# Patient Record
Sex: MALE | Race: WHITE
[De-identification: names, ages, dates, MRNs, and addresses within clinical notes are randomized per-mention and may not be internally consistent; named-entity substitution may affect disease eponyms.]

---

## 2017-10-11 ENCOUNTER — HOSPITAL ENCOUNTER (EMERGENCY)
Dept: HOSPITAL 80 - FED | Age: 32
Discharge: HOME | End: 2017-10-11
Payer: MEDICAID

## 2017-10-11 VITALS
HEART RATE: 91 BPM | OXYGEN SATURATION: 93 % | SYSTOLIC BLOOD PRESSURE: 166 MMHG | RESPIRATION RATE: 16 BRPM | DIASTOLIC BLOOD PRESSURE: 111 MMHG

## 2017-10-11 VITALS — TEMPERATURE: 97.9 F

## 2017-10-11 DIAGNOSIS — I11.9: Primary | ICD-10-CM

## 2017-10-11 DIAGNOSIS — G47.33: ICD-10-CM

## 2017-10-11 LAB
% IMMATURE GRANULYOCYTES: 1.8 % (ref 0–1.1)
ABSOLUTE IMMATURE GRANULOCYTES: 0.13 10^3/UL (ref 0–0.1)
ABSOLUTE NRBC COUNT: 0 10^3/UL (ref 0–0.01)
ADD DIFF?: NO
ADD MORPH?: NO
ADD SCAN?: NO
ALBUMIN SERPL-MCNC: 3.3 G/DL (ref 3.5–5)
ALP SERPL-CCNC: 49 IU/L (ref 38–126)
ALT SERPL-CCNC: 89 IU/L (ref 21–72)
ANION GAP SERPL CALC-SCNC: 9 MEQ/L (ref 8–16)
APTT BLD: 25.6 SEC (ref 23–38)
AST SERPL-CCNC: 71 IU/L (ref 17–59)
ATYPICAL LYMPHOCYTE FLAG: 0 (ref 0–99)
BILIRUB SERPL-MCNC: 1.4 MG/DL (ref 0.1–1.4)
BILIRUBIN-CONJUGATED: 0.4 MG/DL (ref 0–0.5)
BILIRUBIN-UNCONJUGATED: 1 MG/DL (ref 0–1.1)
CALCIUM SERPL-MCNC: 9.3 MG/DL (ref 8.5–10.4)
CHLORIDE SERPL-SCNC: 106 MEQ/L (ref 97–110)
CO2 SERPL-SCNC: 27 MEQ/L (ref 22–31)
CREAT SERPL-MCNC: 1.1 MG/DL (ref 0.7–1.3)
ERYTHROCYTE [DISTWIDTH] IN BLOOD BY AUTOMATED COUNT: 16.2 % (ref 11.5–15.2)
FRAGMENT RBC FLAG: 0 (ref 0–99)
GFR SERPL CREATININE-BSD FRML MDRD: > 60 ML/MIN/{1.73_M2}
GLUCOSE SERPL-MCNC: 166 MG/DL (ref 70–100)
HCT VFR BLD CALC: 61.3 % (ref 40–51)
HGB BLD-MCNC: 20.5 G/DL (ref 13.7–17.5)
INR PPP: 1 (ref 0.83–1.16)
LEFT SHIFT FLG: 10 (ref 0–99)
LIPEMIA HEMOLYSIS FLAG: 80 (ref 0–99)
MCH RBC BLDCO QN: 29.9 PG (ref 27.9–34.1)
MCHC RBC AUTO-ENTMCNC: 33.4 G/DL (ref 32.4–36.7)
MCV RBC AUTO: 89.4 FL (ref 81.5–99.8)
NRBC-AUTO%: 0 % (ref 0–0.2)
PLATELET # BLD: 230 10^3/UL (ref 150–400)
PLATELET CLUMPS FLAG: 20 (ref 0–99)
PMV BLD AUTO: 11.4 FL (ref 8.7–11.7)
POTASSIUM SERPL-SCNC: 5 MEQ/L (ref 3.5–5.2)
PROT SERPL-MCNC: 6 G/DL (ref 6.3–8.2)
PROTHROMBIN TIME: 13.1 SEC (ref 12–15)
RBC # BLD AUTO: 6.86 10^6/UL (ref 4.4–6.38)
SODIUM SERPL-SCNC: 142 MEQ/L (ref 134–144)
TROPONIN I SERPL-MCNC: 0.03 NG/ML (ref 0–0.03)

## 2017-10-11 PROCEDURE — 71020: CPT

## 2017-10-11 PROCEDURE — 99285 EMERGENCY DEPT VISIT HI MDM: CPT

## 2017-10-11 PROCEDURE — 93005 ELECTROCARDIOGRAM TRACING: CPT

## 2017-10-11 NOTE — EDPHY
H & P


Stated Complaint: SOB/wheezing x couple of wks; out of BP med>1yr


Time Seen by Provider: 10/11/17 09:38


HPI/ROS: 


HPI:  This is a 32-year-old male who presents with





Chief Complaint:SOB/wheezing x couple of wks; out of BP med>1yr





Location:  Chest


Quality:  Dyspnea


Duration:  Couple of weeks


Signs and Symptoms:  No lower extremity swelling, no chest pain, no palpitations

, no wheezing


Timing:  Worsening


Severity:  Moderate


Context:  Patient has a history of hypertension and AGUEDA; he has been out of his 

meds greater than a year and has not used his CPAP machine and greater than 1 

year.  Three weeks ago he believes that he had a virus that he contracted from 

his children when they return to school.  At that time he felt, low-grade fevers

, body aches and fatigue.  He normally works out weight lifting 5 days a week.  

He thought he was run down and took a break from weightlifting.  Yesterday he 

returned, and during his workout he became extremely tired and short of breath.

  He returned home and was doing some yard work and again felt inability to 

take a deep breath and when he did chest discomfort or pressure accompanied by 

a feeling of shortness of breath.  He denies any recent long distance trips or 

lower extremity swelling.  For surgery approximately 3 years ago, he had an 

outpatient cardiac stress test by a local cardiologist and it was negative per 

patient for ischemia.  He denies any feelings of chest pain/radiation/nausea/

vomiting.  He has not had any fevers in the last 2 weeks.  Denies a productive 

cough. 


Modifying Factors: 





Comment: 








ROS: see HPI


Constitutional: No fever, no chills, no weight loss


Eyes:  No blurred vision


Respiratory:  No shortness of breath, no cough


Cardiovascular:  No chest pain


Gastrointestinal:  No nausea, no vomiting, no diarrhea 


Genitourinary:  No dysuria 


Extremities:  No myalgias 


Neurologic:  No weakness, no numbness


Skin:  No rashes


Hematologic:  No bruising, no bleeding





MEDICAL/SURGICAL/SOCIAL HISTORY: 


Medical history:  HTN, L tricep rupture, AGUEDA


Surgical history:  Left hand surgery for cyst, wisdom tooth extraction.


Social history:  .  Has children. 











CONSTITUTIONAL:  Muscular white male, awake and alert, no obvious distress


HEENT: Atraumatic and normocephalic, PERRL, EOMI. Tympanic membranes clear.  

Full beard noted. Oropharynx clear, no exudate and moist pink mucosa.  Airway 

patent.  No lymphadenopathy.  


NECK:  Short and thick in girth. No meningismus.


Cardiovascular: Normal S1/S2, regular rate, regular rhythm, without murmur rub 

or gallop.


PULMONARY/CHEST:  Symmetrical and nontender. Clear to auscultation bilaterally. 

Good air movement. No accessory muscle usage.


ABDOMEN:  Soft, nondistended, nontender, no rebound, no guarding, no peritoneal 

signs, no masses or organomegaly. No CVAT.


EXTREMITIES:  2/2 pulses, no deformities, no clubbing, no cyanosis or edema.  

No calf tenderness.  No palpable cord.  Negative Homans sign. 


NEUROLOGICAL: no focal neuro deficits.  GCS 15.


SKIN: Warm and dry, no erythema. no rash.  Good capillary refill. 








Source: Patient


Exam Limitations: No limitations





- Personal History


Current Tetanus Diphtheria and Acellular Pertussis (TDAP): Yes





- Medical/Surgical History


Hx Asthma: No


Hx Chronic Respiratory Disease: No


Hx Diabetes: No


Hx Cardiac Disease: No


Hx Renal Disease: No


Hx Cirrhosis: No


Hx Alcoholism: No


Hx HIV/AIDS: No


Hx Splenectomy or Spleen Trauma: No


Other PMH: HTN, L tricep rupture, AGUEDA, L hand surgery for cyst, wisdom tooth 

extraction.





- Social History


Smoking Status: Never smoked


Constitutional: 


 Initial Vital Signs











Temperature (C)  36.6 C   10/11/17 09:05


 


Heart Rate  96   10/11/17 09:05


 


Respiratory Rate  18   10/11/17 09:05


 


Blood Pressure  167/113 H  10/11/17 09:05


 


O2 Sat (%)  91 L  10/11/17 09:05








 











O2 Delivery Mode               Room Air


 


O2 (L/minute)                  2














Allergies/Adverse Reactions: 


 





lisinopril Allergy (Intermediate, Verified 10/11/17 09:06)


 Wheezing


tramadol HCl [From Ultram] Allergy (Mild, Verified 10/11/17 09:06)


 lightheaded








Home Medications: 














 Medication  Instructions  Recorded


 


Losartan/Hydrochlorothiazide 1 each PO DAILY #30 tablet 10/11/17





[Hyzaar 100-25 Tablet]  














Medical Decision Making





- Diagnostics


EKG Interpretation: 


12 lead EKG:


Indication:  dyspnea 


Rhythm:  Normal sinus rhythm, rate of 66 beats per minute


Axis:  Normal


Intervals:  Normal


QRS:  Normal


ST segments:  Nonspecific changes and more prominent V2 V3


T waves:   Depressed II, III, avF, V5, V6


INTERPRETATION:  Compared with EKG on 07/23/2014


The 12 lead EKG was interpreted by myself and with attending.





Imaging Results: 


 Imaging Impressions





Chest X-Ray  10/11/17 09:45


Impression:  


1. Poor inspiratory phase.


2. Bronchitis/airways disease.


3. No definite pneumonia.











ED Course/Re-evaluation: 


Chest x-ray, labs, oxygen therapy ordered


O2 sats 91% on room air upon arrival


EKG noted to have depressed T-waves; and more prominent non specific ST changes


1030:  Labs reviewed and hemoglobin 20.5, hematocrit twists 61.3; significant 

erythrocytosis; must evaluate for polycythemia vera.  Could also be related to 

sleep apnea and morbid obesity. 


D-dimer unremarkable; troponin within normal limits


Chest xray my read shows mild cardiomegaly; no opacity/effusion/pneumothorax


Decision made to repeat troponin in 3 hours which will be 1300' 2nd troponin is 

0.02; decreasing from the 1st one


1410:  Spoke with cardiology PA who will review case with attending. 


1500:  Spoke with cardiology PA who advised ordered echocardiogram and once 

results received will give recommendations on disposition of patient


1615:  Appreciate cardiology PAZarina, evaluating patient in the ER.  

Recommends starting full-strength losartan/HCTZ, ENT and pulmonology follow-up 

for sleep apnea.  They will see the patient in the office early next week. 


Differential Diagnosis: 


Shortness of breath including but not limited to pulmonary infectious process, 

COPD, asthma, pulmonary embolus and congestive heart failure.








- Data Points


Laboratory Results: 


 Laboratory Results





 10/11/17 10:00 





 10/11/17 10:00 





 











  10/11/17 10/11/17 10/11/17





  13:05 10:00 10:00


 


WBC      





    


 


RBC      





    


 


Hgb      





    


 


Hct      





    


 


MCV      





    


 


MCH      





    


 


MCHC      





    


 


RDW      





    


 


Plt Count      





    


 


MPV      





    


 


Neut % (Auto)      





    


 


Lymph % (Auto)      





    


 


Mono % (Auto)      





    


 


Eos % (Auto)      





    


 


Baso % (Auto)      





    


 


Nucleat RBC Rel Count      





    


 


Absolute Neuts (auto)      





    


 


Absolute Lymphs (auto)      





    


 


Absolute Monos (auto)      





    


 


Absolute Eos (auto)      





    


 


Absolute Basos (auto)      





    


 


Absolute Nucleated RBC      





    


 


Immature Gran %      





    


 


Immature Gran #      





    


 


PT      13.1 SEC SEC





     (12.0-15.0) 


 


INR      1.00 





     (0.83-1.16) 


 


APTT      25.6 SEC SEC





     (23.0-38.0) 


 


D-Dimer      0.27 ug/mLFEU ug/mLFEU





     (0.00-0.50) 


 


Sodium    142 mEq/L mEq/L  





    (134-144)  


 


Potassium    5.0 mEq/L mEq/L  





    (3.5-5.2)  


 


Chloride    106 mEq/L mEq/L  





    ()  


 


Carbon Dioxide    27 mEq/l mEq/l  





    (22-31)  


 


Anion Gap    9 mEq/L mEq/L  





    (8-16)  


 


BUN    14 mg/dL mg/dL  





    (7-23)  


 


Creatinine    1.1 mg/dL mg/dL  





    (0.7-1.3)  


 


Estimated GFR    > 60   





    


 


Glucose    166 mg/dL H mg/dL  





    ()  


 


Calcium    9.3 mg/dL mg/dL  





    (8.5-10.4)  


 


Total Bilirubin    1.4 mg/dL mg/dL  





    (0.1-1.4)  


 


Conjugated Bilirubin    0.4 mg/dL mg/dL  





    (0.0-0.5)  


 


Unconjugated Bilirubin    1.0 mg/dL mg/dL  





    (0.0-1.1)  


 


AST    71 IU/L H IU/L  





    (17-59)  


 


ALT    89 IU/L H IU/L  





    (21-72)  


 


Alkaline Phosphatase    49 IU/L IU/L  





    ()  


 


Troponin I  0.021 ng/mL ng/mL  0.030 ng/mL ng/mL  





   (0.000-0.034)   (0.000-0.034)  


 


NT-Pro-B Natriuret Pep    34 pg/mL pg/mL  





    (0-125)  


 


Total Protein    6.0 g/dL L g/dL  





    (6.3-8.2)  


 


Albumin    3.3 g/dL L g/dL  





    (3.5-5.0)  














  10/11/17





  10:00


 


WBC  7.34 10^3/uL 10^3/uL





   (3.80-9.50) 


 


RBC  6.86 10^6/uL H 10^6/uL





   (4.40-6.38) 


 


Hgb  20.5 g/dL H* g/dL





   (13.7-17.5) 


 


Hct  61.3 % H* %





   (40.0-51.0) 


 


MCV  89.4 fL fL





   (81.5-99.8) 


 


MCH  29.9 pg pg





   (27.9-34.1) 


 


MCHC  33.4 g/dL g/dL





   (32.4-36.7) 


 


RDW  16.2 % H %





   (11.5-15.2) 


 


Plt Count  230 10^3/uL 10^3/uL





   (150-400) 


 


MPV  11.4 fL fL





   (8.7-11.7) 


 


Neut % (Auto)  63.7 % %





   (39.3-74.2) 


 


Lymph % (Auto)  19.5 % %





   (15.0-45.0) 


 


Mono % (Auto)  9.4 % %





   (4.5-13.0) 


 


Eos % (Auto)  4.4 % %





   (0.6-7.6) 


 


Baso % (Auto)  1.2 % %





   (0.3-1.7) 


 


Nucleat RBC Rel Count  0.0 % %





   (0.0-0.2) 


 


Absolute Neuts (auto)  4.68 10^3/uL 10^3/uL





   (1.70-6.50) 


 


Absolute Lymphs (auto)  1.43 10^3/uL 10^3/uL





   (1.00-3.00) 


 


Absolute Monos (auto)  0.69 10^3/uL 10^3/uL





   (0.30-0.80) 


 


Absolute Eos (auto)  0.32 10^3/uL 10^3/uL





   (0.03-0.40) 


 


Absolute Basos (auto)  0.09 10^3/uL 10^3/uL





   (0.02-0.10) 


 


Absolute Nucleated RBC  0.00 10^3/uL 10^3/uL





   (0-0.01) 


 


Immature Gran %  1.8 % H %





   (0.0-1.1) 


 


Immature Gran #  0.13 10^3/uL H 10^3/uL





   (0.00-0.10) 


 


PT  





  


 


INR  





  


 


APTT  





  


 


D-Dimer  





  


 


Sodium  





  


 


Potassium  





  


 


Chloride  





  


 


Carbon Dioxide  





  


 


Anion Gap  





  


 


BUN  





  


 


Creatinine  





  


 


Estimated GFR  





  


 


Glucose  





  


 


Calcium  





  


 


Total Bilirubin  





  


 


Conjugated Bilirubin  





  


 


Unconjugated Bilirubin  





  


 


AST  





  


 


ALT  





  


 


Alkaline Phosphatase  





  


 


Troponin I  





  


 


NT-Pro-B Natriuret Pep  





  


 


Total Protein  





  


 


Albumin  





  











Medications Given: 


 








Discontinued Medications





Perflutren Lipid Microsphere (Definity)  1.1 mg IV AD ONE


   Stop: 10/11/17 15:31


   Last Admin: 10/11/17 16:15 Dose:  Not Given








Departure





- Departure


Disposition: Home, Routine, Self-Care


Clinical Impression: 


 Essential hypertension, AGUEDA (obstructive sleep apnea), Hypertensive 

cardiomegaly





Condition: Fair


Instructions:  Hypertrophic Cardiomyopathy (ED), Snoring (ED), Heart Healthy 

Diet (ED), Hypertension (ED)


Additional Instructions: 


Please follow-up with pulmonology for repeat sleep study. 


Please follow up with ENT to evaluate options for snoring and sleep apnea. 


Please follow-up with Cardiology early next week. 


Take all medications as prescribed. 


Referrals: 


Zarina Marte PA [Physician Assistant] - 10/17/17 11:00 am


Jamarcus Quinteros MD [Medical Doctor] - As per Instructions


Orlando Be MD [Medical Doctor] - As per Instructions


Prescriptions: 


Losartan/Hydrochlorothiazide [Hyzaar 100-25 Tablet] 1 each PO DAILY #30 tablet

## 2017-10-11 NOTE — CPEKG
Heart Rate: 93

RR Interval: 645

P-R Interval: 156

QRSD Interval: 82

QT Interval: 348

QTC Interval: 433

P Axis: 49

QRS Axis: 51

T Wave Axis: 264

EKG Severity - ABNORMAL ECG -

EKG Impression: SINUS RHYTHM

EKG Impression: PROBABLE LEFT ATRIAL ABNORMALITY

EKG Impression: ABNORMAL T, CONSIDER ISCHEMIA, DIFFUSE LEADS

EKG Impression: BORDERLINE ST ELEVATION, ANTERIOR LEADS

Electronically Signed By: Domo Chowdary 11-Oct-2017 10:16:33

## 2017-10-11 NOTE — GCON
[f rep st]



                                                                    CONSULTATION





CARDIOLOGY CONSULTATION



DATE OF CONSULTATION:  10/11/2017



REASON FOR CONSULTATION:  We were asked by Elaine Cortez PA-C, to evaluate this patient for his hyperte
nsive urgency.



HISTORY OF PRESENT ILLNESS:  The patient is a 32-year-old male with untreated hypertension and untrea
antionette obstructive sleep apnea.  He reports running out of his medications approximately 1 year ago.  Hi
s primary care physician had changed practices, and he did not seek followup.  He also has had CPAP p
rescribed for him but was unable to tolerate it, and so it was taken away from him.  He has documente
d pretty severely elevated blood pressures, even back to 2014.  Approximately 3 weeks ago, he contrac
antionette a viral illness with low-grade fevers, body aches, and fatigue.  Approximately 5 days ago, he res
umed a regular work out routine which typically involves lifting weights.  With his weightlifting he 
noted feeling run down and short of breath.  He was trying to engage in some household activities and
 started to note that he felt very short of breath.  [facility] found to be having hypertensive urgency with blood pressure of 166/111.  He denies any hea
dache, visual changes, chest pain, PND, or orthopnea.



PAST MEDICAL/SURGICAL HISTORY:  

1.  Hypertension.

2.  Previous triceps rupture.

3.  Hand surgery.



SOCIAL HISTORY:  He is .  He is a never smoker.



REVIEW OF SYSTEMS:  As per HPI.  A complete 10-point review of systems was obtained and is negative e
xcept for what is dictated.



FAMILY HISTORY:  Father with hypertension, grandfather with hypertension and dementia.  No sudden car
diac death or cardiovascular diseases.



ALLERGIES:  Lisinopril and tramadol.



CURRENT HOME MEDICATIONS:  None listed.



PHYSICAL EXAMINATION:  VITAL SIGNS:  BP of 166/111, heart rate of 91, respirations 16, O2 saturation 
93% on room air.  GENERAL:  A very pleasant male in no apparent distress.  Obese with a BMI of 45.  H
EAD:  Normocephalic atraumatic.  EYES:  PERRL without scleral icterus.  NECK:  No obvious JVD.  HEART
:  Regular rate and rhythm with distant heart sounds.  LUNGS:  Clear to auscultation.  ABDOMEN:  Nont
braeden with normoactive bowel sounds.  Obese.  SKIN:  Warm and dry without gross joint deformities det
ected.  PSYCH:  Normal mood and affect.  NEURO:  He is AAO x3 grossly.



LABORATORY DATA:  BMP with sodium 142, potassium 5, chloride 106, CO2 of 27, BUN 14, creatinine 1.1, 
glucose 166.  AST 71, ALT 89.  NT proBNP of 34.  Troponin of 0.03, then 0.02.  WBC was 7.34, hemoglob
in 20.5, hematocrit 61.3, platelet count of 230.  12-lead ECG, personally interpreted, demonstrates s
inus rhythm with LVH and strain pattern, BREANNA.  Echo reviewed with Dr. Ferris, and he is found to ha
ve EF of 50%, diastolic dysfunction, LVH.  Reported is yet to be finalized.



IMPRESSION AND PLAN:  

1.  Hypertensive urgency.  Patient's blood pressure is severely elevated.  We will plan to start with
 losartan with hydrochlorothiazide.  He will be put in for close clinical followup to better manage h
is blood pressure.

2.  Hypertensive cardiomyopathy.  He does not appear to be overtly fluid overloaded.  We will defer L
asix at this point.  We have stressed to him the importance of improving his blood pressure control.

3.  Untreated sleep apnea.  We discussed the role that this plays in blood pressure and weight gain.

4.  Obesity.

5.  Polycythemia, likely secondary to untreated sleep apnea and hypoxia.  He will need to establish w
ith a primary care provider and potentially be referred to Hematology. 



He is scheduled for followup in 1 week's time in our clinic.





Job #:  [id number]

## 2017-10-12 NOTE — ECHO
[email or web address]





[address] [address] 

Main: [phone] 



Fax: 



Transthoracic Echocardiogram 

Name:             LEVY GRANADOS                      MR#:

[id number]

Study Date:       10/11/2017                            Study Time:

03:05 PM

YOB: 1985                            Age:

32 year(s)

Height:           177.8 cm (70 in.)                     Weight:

142.88 kg (315 lb.)

BSA:              2.53 m2                               Gender:

Male

Examination:      Echo with Definity                    Indication: 

Image Quality:    Technically Difficult                 Contrast:

0.165 mg  I.V. dose of Definity was



administered to improve endocardial border 

definition. 



Requested by:     Zarina Marte                         BP:

156 mmHg/111 mmHg

Heart Rate:                                             Rhythm: 

Indication: 



Procedure Staff 

Ultrasound Technician:   Aure Alanis 

Reading Physician:       Bubba Ferris 

Requesting Provider: 



Conclusions:           Concentric left ventricular hypertrophy with

borderline left ventricular systolic function

diastolic dysfunction with normal left atrial size and no tissue

Doppler evidence of elevated left

ventricular end-diastolic pressure 

no significant valvular abnormalities by Doppler 

normal ascending aortic measurement of 3.1 cm 



Measurements: 

Chambers                    Valvular Assessment AV/MV

Valvular Assessment TV/PV



Normal                                   Normal

Normal

Name         Value     Range              Name         Value Range

Name           Value Range

Ao Eloise (MM): 3.1 cm    (2.2 cm-3.7            AV Vmax:     1.03 m/s (1

m/s-1.7       PV Vmax:       0.76 m/s (0.6 m/s-0.9



cm)                                  m/s)

m/s)

IVSd (2D):   1.1 cm (0.6 cm-1.1               AV maxP mmHg ( -

)          PV PGmax:      2  mmHg ( - )



cm)                LVOT Vmax:   0.90 m/s (0.7 m/s-1.1 

LVDd (2D):   4.9 cm    (4.2 cm-5.9                              m/s)  



cm)                MV E Vmax:   0.55 m/s ( - )  

LVDs (2D):   3.9 cm    (2.1 cm-4              MV A Vmax:   0.72 m/s (

- )



cm)                MV E/A:      0.76 ( - )  

LVPWd (2D):  1.0 cm    (0.6 cm-1 



cm)   

LVEF (BP):   49 %      (>=55 %)   



Continued Measurements: 

Chambers                    Valvular Assessment AV/MV  



Name                     Value            Name

Value

LADs Lon.6 cm                MV DecTime:

190 m/s

LA Area:                14.1 cm2          MV E/E' Septal:       11.80





Patient: LEVY GRANADOS                     MRN: [id number]

Study Date: 10/11/2017   Page 1 of 2

03:05 PM 









LA Volume: 35 ml   MV E/E' Lateral: 9.30    

LA Volume Index:        13.8 ml/m2   



Additional Vessels  



Name                       Value  

Ao Ascending:           3.1 cm    



Findings:              Left Ventricle: 

Normal size left ventricle. Moderate concentric LV hypertrophy. Low

normal left ventricular systolic

function. EF is 49 %. No regional wall motion abnormality. Grade 1

diastolic dysfunction (abnormal

relaxation). No LV apical thrombus. Left ventricule appears very

stiff..

Right Ventricle: 

Normal size right ventricle.  

Left Atrium: 

The left atrium is normal in size.  

Right Atrium: 

The right atrium is normal in size.  

Mitral Valve: 

The mitral valve is normal in appearance. Trivial to mild mitral

regurgitation.

Aortic Valve: 

Aortic valve is not visualized. There is no aortic valve

regurgitation.

Tricuspid Valve: 

The tricuspid valve appears normal. There is no tricuspid valve

regurgitation.

Pulmonic Valve: 

Pulmonary valve not well visualized.  

Aorta: 

Normal size aortic root measuring 3.1 cm. Normal size ascending aorta

measuring 3.1 cm.

Pericardium: 

No pericardial effusion. 







Electronically signed by Bubba Ferris on 10/12/2017 at 07:56 AM 

(No Signature Object) 



Patient: LEVY GRANADOS                     MRN: [id number]

Study Date: 10/11/2017   Page 2 of 2

03:05 PM 







[id number]

## 2019-06-29 ENCOUNTER — HOSPITAL ENCOUNTER (EMERGENCY)
Dept: HOSPITAL 80 - FED | Age: 34
Discharge: HOME | End: 2019-06-29
Payer: MEDICAID

## 2019-09-23 NOTE — CPEKG
Consent 3/Introductory Paragraph: I gave the patient a chance to ask questions they had about the procedure.  Following this I explained the Mohs procedure and consent was obtained. The risks, benefits and alternatives to therapy were discussed in detail. Specifically, the risks of infection, scarring, bleeding, prolonged wound healing, incomplete removal, allergy to anesthesia, nerve injury and recurrence were addressed. Prior to the procedure, the treatment site was clearly identified and confirmed by the patient. All components of Universal Protocol/PAUSE Rule completed. Heart Rate: 80

RR Interval: 750

P-R Interval: 156

QRSD Interval: 86

QT Interval: 384

QTC Interval: 443

P Axis: 38

QRS Axis: 60

T Wave Axis: 243

EKG Severity - ABNORMAL ECG -

EKG Impression: SINUS RHYTHM

EKG Impression: ABNORMAL T, CONSIDER ISCHEMIA, DIFFUSE LEADS

Electronically Signed By: Domo Chowdary 11-Oct-2017 13:45:55